# Patient Record
Sex: FEMALE | Race: BLACK OR AFRICAN AMERICAN
[De-identification: names, ages, dates, MRNs, and addresses within clinical notes are randomized per-mention and may not be internally consistent; named-entity substitution may affect disease eponyms.]

---

## 2017-12-09 NOTE — EDM.PDOC
ED HPI GENERAL MEDICAL PROBLEM





- General


Chief Complaint: ENT Problem


Stated Complaint: NOSE BLEED/PREGNANT 9 WEEKS


Time Seen by Provider: 17 20:50


Source of Information: Reports: Patient


History Limitations: Reports: No Limitations





- History of Present Illness


INITIAL COMMENTS - FREE TEXT/NARRATIVE: 


HISTORY AND PHYSICAL:





History of present illness:


[Patient comes to the emergency room complaining of a bloody nose this evening. 

Is 9 weeks pregnant and is afraid that she may be miscarrying. Had no problems 

stopping the flow of blood from her right nostril with pressure in time. States 

that she also coughed up some blood after draining down the back of her throat. 

She is  1 para 0 and is very anxious about her first pregnancy.]





Review of systems: 


As per history of present illness and below otherwise all systems reviewed and 

negative.





Past medical history: 


As per history of present illness and as reviewed below otherwise 

noncontributory.





Surgical history: 


As per history of present illness and as reviewed below otherwise 

noncontributory.





Social history: 


No reported history of drug or alcohol abuse.





Family history: 


As per history of present illness and as reviewed below otherwise 

noncontributory.





Physical exam:


HEENT: Atraumatic, normocephalic. Dried blood present in R nare. No active 

bleeding is appreciated. L nare is clear. Small amount of blood in posterior 

oropharynx.


Extremities: Atraumatic. Full range of motion and no swelling or cyanosis. 

Neurovascular unremarkable.


Neuro: Awake, alert, oriented.  Motor and sensory unremarkable throughout. Exam 

nonfocal.





Impression: 


[Bloody nose


First trimester pregnancy]





Plan:


[Discussed with patient that nosebleeds are not a symptom of miscarriage and 

that her vital signs are stable. Recommend that she hold pressure when she 

experiences a nosebleed and reports the ER for evaluation only if the bleeding 

is unable to be stopped. Encourage no picking of her nose and to use a 

humidifier beside her bed. Follow-up with OB as scheduled. She is in agreement 

with today's plan.]





Definitive disposition and diagnosis as appropriate pending reevaluation and 

review of above.











- Related Data


 Allergies











Allergy/AdvReac Type Severity Reaction Status Date / Time


 


No Known Allergies Allergy   Verified 17 20:24











Home Meds: 


 Home Meds





PNV95/Ferrous Fumarate/FA [Prenatal Tablet] 1 tab PO DAILY 17 [History]











Past Medical History


HEENT History: Reports: None


Cardiovascular History: Reports: None


Respiratory History: Reports: None


Gastrointestinal History: Reports: None


Genitourinary History: Reports: None


OB/GYN History: Reports: None


Musculoskeletal History: Reports: Back Pain, Chronic


Other Musculoskeletal History: chronic bilat. shoulder pain, neck and lower 

back pain from macromastia


Neurological History: Reports: None


Psychiatric History: Reports: None


Endocrine/Metabolic History: Reports: None


Hematologic History: Reports: None


Immunologic History: Reports: None


Oncologic (Cancer) History: Reports: None


Dermatologic History: Reports: Other (See Below)


Other Dermatologic History: rash on both arms





- Infectious Disease History


Infectious Disease History: Reports: Chicken Pox





- Past Surgical History


Head Surgeries/Procedures: Reports: None


HEENT Surgical History: Reports: None


Cardiovascular Surgical History: Reports: None


Respiratory Surgical History: Reports: None


GI Surgical History: Reports: None


Female  Surgical History: Reports: None


Musculoskeletal Surgical History: Reports: Other (See Below)


Other Musculoskeletal Surgeries/Procedures:: Breast reduction


Oncologic Surgical History: Reports: None





Social & Family History





- Tobacco Use


Smoking Status *Q: Former Smoker


Used Tobacco, but Quit: Yes


Month Tobacco Last Used: 





- Caffeine Use


Caffeine Use: Reports: Coffee, Energy Drinks, Soda, Tea





- Recreational Drug Use


Recreational Drug Use: No





ED ROS ENT





- Review of Systems


Review Of Systems: ROS reveals no pertinent complaints other than HPI.





ED EXAM, ENT





- Physical Exam


Exam: See Below





Course





- Vital Signs


Last Recorded V/S: 


 Last Vital Signs











Temp  98.5 F   17 20:22


 


Pulse  94   17 20:22


 


Resp  12   17 20:22


 


BP  130/74   17 20:22


 


Pulse Ox  96   17 20:22














Departure





- Departure


Time of Disposition: 21:00


Disposition: Home, Self-Care 01


Condition: Good


Clinical Impression: 


 First trimester pregnancy








- Discharge Information


Instructions:  First Trimester of Pregnancy, Easy-to-Read


Referrals: 


PCP,None [Primary Care Provider] - 


Forms:  ED Department Discharge


Additional Instructions: 


The following information is given to patients seen in the emergency department 

who are being discharged to home. This information is to outline your options 

for follow-up care. We provide all patients seen in our emergency department 

with a follow-up referral.





The need for follow-up, as well as the timing and circumstances, are variable 

depending upon the specifics of your emergency department visit.





If you don't have a primary care physician on staff, we will provide you with a 

referral. We always advise you to contact your personal physician following an 

emergency department visit to inform them of the circumstance of the visit and 

for follow-up with them and/or the need for any referrals to a consulting 

specialist.





The emergency department will also refer you to a specialist when appropriate. 

This referral assures that you have the opportunity for follow-up care with a 

specialist. All of these measure are taken in an effort to provide you with 

optimal care, which includes your follow-up.





Under all circumstances we always encourage you to contact your private 

physician who remains a resource for coordinating your care. When calling for 

follow-up care, please make the office aware that this follow-up is from your 

recent emergency room visit. If for any reason you are refused follow-up, 

please contact the [facility]  emergency 

department at [phone] and asked to speak to the emergency department 

charge nurse.








[facility]


Primary care - Women's Health


[address] [place] [id number]


Phone: [phone]


Fax: [phone]








Follow-up with your OB doctor as scheduled for later this week.


Continue to monitor your symptoms and push fluids.


Put a humidifier at the bedside.


Return to ER as needed as discussed.

## 2018-07-23 ENCOUNTER — HOSPITAL ENCOUNTER (INPATIENT)
Dept: HOSPITAL 56 - MW.OBCHECK | Age: 28
LOS: 1 days | Discharge: HOME | DRG: 560 | End: 2018-07-24
Attending: OBSTETRICS & GYNECOLOGY | Admitting: OBSTETRICS & GYNECOLOGY
Payer: COMMERCIAL

## 2018-07-23 DIAGNOSIS — O48.0: Primary | ICD-10-CM

## 2018-07-23 DIAGNOSIS — Z3A.41: ICD-10-CM

## 2018-07-23 LAB
CHLORIDE SERPL-SCNC: 102 MMOL/L (ref 98–107)
SODIUM SERPL-SCNC: 135 MMOL/L (ref 136–145)

## 2018-07-23 PROCEDURE — 3E0P7VZ INTRODUCTION OF HORMONE INTO FEMALE REPRODUCTIVE, VIA NATURAL OR ARTIFICIAL OPENING: ICD-10-PCS | Performed by: OBSTETRICS & GYNECOLOGY

## 2018-07-23 PROCEDURE — 0HQ9XZZ REPAIR PERINEUM SKIN, EXTERNAL APPROACH: ICD-10-PCS | Performed by: OBSTETRICS & GYNECOLOGY

## 2018-07-23 PROCEDURE — 3E033VJ INTRODUCTION OF OTHER HORMONE INTO PERIPHERAL VEIN, PERCUTANEOUS APPROACH: ICD-10-PCS | Performed by: OBSTETRICS & GYNECOLOGY

## 2018-07-23 RX ADMIN — MISOPROSTOL PRN MCG: 100 TABLET ORAL at 01:20

## 2018-07-23 RX ADMIN — MISOPROSTOL PRN MCG: 100 TABLET ORAL at 05:57

## 2018-07-23 NOTE — PCM.DEL
L & D Note





- General Info


Date of Service: 18


Mother's Due Date: 18





- Delivery Note


Labor: Induced by Oxytocin


Cervical Ripening Method: Misoprostil


Delivery Outcome: Livebirth


Birth Presentation: Right Occiput Anterior (MATIAS)


Nuchal Cord: Present (around neck and hand )


Anesthesia Type: Epidural


Laceration: 1st Degree


Suture type: Other (monocyrl )


Suture size: 3-0


Placenta: Intact


Cord: 3 Vessels


Estimated Blood Loss: 400


Resuscitation Needed: No


APGAR Score 1 min: 7


APGAR Score 5 min: 9


Delivery Comments (Free Text/Narrative):: 





Live male  delivered at 1928 , apgar 7/9 


1st degree perineal laceration repaired with monocryl 3.0 





Hemabate given with Pitocin 30m/500cc X 2 doses 





- General Info


Date of Service: 18





- Patient Data


Weight - Most Recent: 104.78 kg


Lab Results Last 24 Hours: 


 Laboratory Results - last 24 hr











  18 Range/Units





  00:55 00:55 10:25 


 


WBC  12.15 H    (4.0-11.0)  K/uL


 


RBC  4.50    (4.30-5.90)  M/uL


 


Hgb  13.4    (12.0-16.0)  g/dL


 


Hct  39.5    (36.0-46.0)  %


 


MCV  87.8    (80.0-98.0)  fL


 


MCH  29.8    (27.0-32.0)  pg


 


MCHC  33.9    (31.0-37.0)  g/dL


 


RDW Std Deviation  44.6    (28.0-62.0)  fl


 


RDW Coeff of Sindy  14    (11.0-15.0)  %


 


Plt Count  224    (150-400)  K/uL


 


MPV  10.30    (7.40-12.00)  fL


 


Nucleated RBC %  0.5    /100WBC


 


Nucleated RBCs #  0    K/uL


 


Sodium    135 L  (136-145)  mmol/L


 


Potassium    3.8  (3.5-5.1)  mmol/L


 


Chloride    102  ()  mmol/L


 


Carbon Dioxide    22.6  (21.0-32.0)  mmol/L


 


BUN    6 L  (7.0-18.0)  mg/dL


 


Creatinine    0.7  (0.6-1.0)  mg/dL


 


Est Cr Clr Drug Dosing    118.43  mL/min


 


Estimated GFR (MDRD)    > 60.0  ml/min


 


Glucose    87  ()  mg/dL


 


Uric Acid    4.8  (2.6-7.2)  mg/dL


 


Calcium    9.1  (8.5-10.1)  mg/dL


 


Total Bilirubin    0.2  (0.2-1.0)  mg/dL


 


AST    18  (15-37)  IU/L


 


ALT    19  (14-63)  IU/L


 


Alkaline Phosphatase    106  ()  U/L


 


Total Protein    7.4  (6.4-8.2)  g/dL


 


Albumin    3.0 L  (3.4-5.0)  g/dL


 


Globulin    4.4 H  (2.0-3.5)  g/dL


 


Albumin/Globulin Ratio    0.7 L  (1.3-2.8)  


 


Blood Type   O POSITIVE   


 


Antibody Screen   NEGATIVE   











Med Orders - Current: 


 Current Medications





Acetaminophen (Tylenol Extra Strength)  500 mg PO Q4H PRN


   PRN Reason: Pain


Acetaminophen (Tylenol Extra Strength)  1,000 mg PO Q4H PRN


   PRN Reason: Pain


Benzocaine/Menthol (Dermoplast Pain Relief 20%-0.5% Spray)  78 gm TOP 

ASDIRECTED PRN


   PRN Reason: Perineal Comfort Measure


Bisacodyl (Dulcolax)  10 mg RECTAL .ONCE PRN


   PRN Reason: Constipation


Butorphanol Tartrate (Stadol)  1 mg IVPUSH Q1H PRN


   PRN Reason: Pain


   Last Admin: 18 06:17 Dose:  1 mg


Carboprost Tromethamine (Hemabate Ds)  250 mcg IM ASDIRECTED PRN


   PRN Reason: Post Partum Hemorrhage


Docusate Sodium (Colace)  100 mg PO BID PRN


   PRN Reason: Constipation


Emollient Ointment (Lansinoh Hpa)  0 gm TOP ASDIRECTED PRN


   PRN Reason: Sore Nipples


Lactated Ringer's (Ringers, Lactated)  1,000 mls @ 150 mls/hr IV ASDIRECTED HYACINTH


   Last Admin: 18 17:58 Dose:  150 mls/hr


Oxytocin/Sodium Chloride (Oxytocin 30 Unit/500 Ml-Ns)  30 unit in 500 mls @ 999 

mls/hr IV TITRATE HYACINTH


Tranexamic Acid 1,000 mg/ (Sodium Chloride)  110 mls @ 660 mls/hr IV ONETIME PRN


   PRN Reason: Bleeding


Oxytocin/Sodium Chloride (Oxytocin 30 Unit/500 Ml-Ns)  30 unit in 500 mls @ 2 

mls/hr IV TITRATE HYACINTH; Protocol


   Last Titration: 18 18:05 Dose:  8 munits/min, 8 mls/hr


Ibuprofen (Motrin)  400 mg PO Q4H PRN


   PRN Reason: Pain


Ibuprofen (Motrin)  800 mg PO Q6H PRN


   PRN Reason: Pain


Lidocaine HCl (Xylocaine 1%)  50 ml INJECT .ONCE PRN


   PRN Reason: Laceration repair


Methylergonovine Maleate (Methergine)  0.2 mg IM ASDIRECTED PRN


   PRN Reason: Post Partum Hemorrhage


Misoprostol (Cytotec)  200 mcg PO .ONCE PRN


   PRN Reason: Post Partum Hemorrhage


Misoprostol (Cytotec)  25 mcg VAG Q4HR PRN


   PRN Reason: cervical ripening


   Last Admin: 18 05:57 Dose:  25 mcg


Misoprostol (Cytotec)  25 mcg VAG Q4H PRN


   PRN Reason: Cervical Ripening


Nalbuphine HCl (Nubain)  10 mg IVPUSH Q1H PRN


   PRN Reason: Pain (severe 7-10)


Oxycodone HCl (Oxycodone)  5 mg PO Q2H PRN


   PRN Reason: Pain


Sodium Chloride (Saline Flush)  10 ml FLUSH ASDIRECTED PRN


   PRN Reason: Keep Vein Open


Sodium Chloride (Saline Flush)  2.5 ml FLUSH ASDIRECTED PRN


   PRN Reason: Keep Vein Open


Sterile Water (Sterile Water For Irrigation)  1,000 ml IRR ASDIRECTED PRN


   PRN Reason: delivery


Terbutaline Sulfate (Brethine)  0.25 mg SUBCUT ASDIRECTED PRN


   PRN Reason: Tacysystole


Witch Hazel (Tucks)  1 pad TOP ASDIRECTED PRN


   PRN Reason: comfort care





Discontinued Medications





Ampicillin Sodium 2 gm/ Sodium (Chloride)  100 mls @ 200 mls/hr IV ONETIME ONE


   Stop: 18 00:59


   Last Admin: 18 01:24 Dose:  200 mls/hr


Ampicillin Sodium 1 gm/ Sodium (Chloride)  50 mls @ 100 mls/hr IV Q4H [facility]


   Last Admin: 18 17:40 Dose:  100 mls/hr


Fentanyl/Bupivacaine HCl (Fentanyl-Bupiv-Ns 2 Mcg/Ml-0.125%) Confirm 

Administered Dose 100 mls @ as directed EP .STK-MED ONE


   Stop: 18 09:51


Fentanyl/Bupivacaine HCl (Fentanyl-Bupiv-Ns 2 Mcg/Ml-0.125%) Confirm 

Administered Dose 100 mls @ as directed EP .STK-MED ONE


   Stop: 18 19:11


Oxytocin/Sodium Chloride (Oxytocin 30 Unit/500 Ml-Ns) Confirm Administered Dose 

30 unit in 500 mls @ as directed .ROUTE .STK-MED ONE


   Stop: 18 20:05


Ropivacaine (Naropin 0.2%) Confirm Administered Dose 20 ml .ROUTE .STK-MED ONE


   Stop: 18 09:51











- Problem List Review


Problem List Initiated/Reviewed/Updated: Yes





- My Orders


Last 24 Hours: 


My Active Orders





18 00:30


Patient Status [ADT] Routine 


Fetal Heart Tones [RC] CONTINUOUS 


Fetal Non Stress Test [RC] PER UNIT ROUTINE 


May Shower [RC] ASDIRECTED 


Notify Provider [RC] PRN 


Up ad Reta [RC] ASDIRECTED 


Vaginal Exam [RC] PRN 


Vital Signs [RC] PER UNIT ROUTINE 


Butorphanol [Stadol]   1 mg IVPUSH Q1H PRN 


Carboprost Tromethamine [Hemabate DS]   250 mcg IM ASDIRECTED PRN 


Lactated Ringers [Ringers, Lactated] 1,000 ml IV ASDIRECTED 


Lidocaine 1% [Xylocaine 1%]   50 ml INJECT .ONCE PRN 


Methylergonovine [Methergine]   0.2 mg IM ASDIRECTED PRN 


Nalbuphine [Nubain]   10 mg IVPUSH Q1H PRN 


Oxytocin/0.9 % Sodium Chloride [Oxytocin 30 Unit/500 ML-NS] 30 unit in 500 ml 

IV TITRATE 


Sodium Chloride 0.9% [Saline Flush]   10 ml FLUSH ASDIRECTED PRN 


Sodium Chloride 0.9% [Saline Flush]   2.5 ml FLUSH ASDIRECTED PRN 


Tranexamic Acid [Cyklokapron] 1,000 mg   Sodium Chloride 0.9% [Normal Saline] 

100 ml IV ONETIME 


Water For Irrigation,Sterile [Sterile Water for Irrigation]   1,000 ml IRR 

ASDIRECTED PRN 


miSOPROStol [Cytotec]   200 mcg PO .ONCE PRN 


Peripheral IV Insertion Adult [OM.PC] Routine 


Resuscitation Status Routine 





18 00:34


Bedrest Bathroom Privileges [RC] ASDIRECTED 


Communication Order [RC] ASDIRECTED 


Notify Provider [RC] PRN 


Notify Provider [RC] PRN 


Notify Provider [RC] STAT 


Oxygen Therapy [RC] ASDIRECTED 


Vaginal Exam [RC] PRN 


Vital Signs [RC] PER UNIT ROUTINE 


Terbutaline [Brethine]   0.25 mg SUBCUT ASDIRECTED PRN 


miSOPROStol [Cytotec]   25 mcg VAG Q4H PRN 


miSOPROStol [Cytotec]   25 mcg VAG Q4HR PRN 





18 00:45


Oxytocin/0.9 % Sodium Chloride [Oxytocin 30 Unit/500 ML-NS] 30 unit in 500 ml 

IV TITRATE 


Medication Administration Instruction [OM.PC] Q3H 





18 20:08


Patient Status [ADT] Routine 


May Shower [RC] ASDIRECTED 


Up ad Reta [RC] ASDIRECTED 


Vital Signs [RC] PER UNIT ROUTINE 


Acetaminophen [Tylenol Extra Strength]   1,000 mg PO Q4H PRN 


Acetaminophen [Tylenol Extra Strength]   500 mg PO Q4H PRN 


Benzocaine/Menthol [Dermoplast Pain Relief 20%-0.5% Spray]   78 gm TOP 

ASDIRECTED PRN 


Bisacodyl [Dulcolax]   10 mg RECTAL .ONCE PRN 


Docusate Sodium [Colace]   100 mg PO BID PRN 


Ibuprofen [Motrin]   400 mg PO Q4H PRN 


Ibuprofen [Motrin]   800 mg PO Q6H PRN 


Lanolin [Lansinoh HPA]   See Dose Instructions  TOP ASDIRECTED PRN 


Witch Hazel [Tucks]   1 pad TOP ASDIRECTED PRN 


oxyCODONE   5 mg PO Q2H PRN 


Assess Lochia [WOMSER] Per Unit Routine 


Assess Uterine Involution [WOMSER] Per Unit Routine 


Peripheral IV Discontinue [OM.PC] Routine 





18 Breakfast


Clear Liquid Diet [DIET] 





18 05:11


HEMOGLOBIN/HEMATOCRIT,HH [HEME] Timed

## 2018-07-24 VITALS — DIASTOLIC BLOOD PRESSURE: 81 MMHG | SYSTOLIC BLOOD PRESSURE: 127 MMHG

## 2018-07-24 NOTE — OR
SURGEON:

LESA JUSTIN

 

DATE OF PROCEDURE:  2018

 

PREOPERATIVE DIAGNOSES:

1. A 28-year-old  1, para 0 at 41 weeks 3 days for induction of labor

    secondary to postdates.

2. Group B Streptococcus positive.

3. Mild preeclampsia.

 

POSTOPERATIVE DIAGNOSES:

1. A 28-year-old  1, para 0 at 41 weeks 3 days for induction of labor

    secondary to postdates.

2. Group B Streptococcus positive.

3. Mild preeclampsia.

4. First-degree perineal laceration.

 

PROCEDURE PERFORMED:

Normal spontaneous vaginal delivery.

Repair of perineal laceration 



EBL; 400ml



FINDINGS:

Live male  delivered at 1928.  Apgar scores of 7 and 9.  Weight is 3240

g.  First-degree perineal laceration, which was repaired with 3-0 Monocryl.

Slightly increased vaginal bleeding.  Hemabate given in addition

to IV 30u of Pitocin x2 bags also given.  Three-vessel cord noted.

 

BRIEF HISTORY ABOUT PATIENT:

She is a 28-year-old , uncomplicated prenatal care except for 

gestational proteinuria; However, during the labor course, was noted to have

elevated blood pressure 130s - 140s/80- 90s .  Denies any symptoms of headache

RUQ pain or blurring of vision. She received 2 doses of Cytotec and pitocin for 
IOL 

. She had a normal labor course after cervical ripening. The patient had 
category 1

tracing for the majority of the induction;

 

DESCRIPTION OF PROCEDURE:

With the patient being fully dilated, she was encouraged to push, and the

patient had good pushing effort.  She delivered the head in MATIAS position

followed by the shoulder.  It was noted that the cord was around the neck.

After delivery of the anterior and posterior shoulder, the body of the infant

was delivered.  The infant was placed on the maternal abdomen.  The cord was

clamped and cut.  The infant was given to the the nursery nurse.  Then,

the placenta was delivered via controlled cord traction.  Cord blood gases were

obtained.  A first-degree laceration was noted.  It was sutured with 3 
interrupted stitches

.  After the delivery, some vaginal bleeding was noted and bimanaual  massage 
was done.  Hemabate was given

in addition to IV pitocin X 2 bags .  Vaginal bleeding was then normal . All

instrument and pad counts were correct x2.  The patient was left in labor and

delivery in  stable condition.

 

 

JOANNA / CARMEN

DD:  2018 20:22:08

DT:  2018 04:07:18

Job #:  [id number]

MTDD

## 2018-07-24 NOTE — [id number]
Post Anesthesia Note





- EVALUATION WITHIN 48HRS OF ANESTHETIC


Vital Signs in Normal Range: Yes


Patient Participated in Evaluation: Yes


Respiratory Function Stable: Yes


Airway Patent: Yes


Cardiovascular Function Stable: Yes


Hydration Status Stable: Yes


Pain Control Satisfactory: Yes


Nausea and Vomiting Control Satisfactory: Yes


Mental Status Recovered: Yes


Resp Rate: 16





- COMMENTS/OBSERVATIONS


Free Text/Narrative:: 





full sensation in legs bilaterally

## 2018-07-24 NOTE — PCM.PNPP
- General Info


Date of Service: 07/24/18


Subjective Update: 


She denies any complains , normal lochia , not breast feeding yet 


Functional Status: Reports: Pain Controlled, Tolerating Diet, Ambulating, 

Urinating





- Review of Systems


General: Reports: No Symptoms


HEENT: Reports: No Symptoms


Pulmonary: Reports: No Symptoms


Cardiovascular: Reports: No Symptoms


Gastrointestinal: Reports: No Symptoms


Genitourinary: Reports: No Symptoms


Musculoskeletal: Reports: No Symptoms


Skin: Reports: No Symptoms


Neurological: Reports: No Symptoms


Psychiatric: Reports: No Symptoms





- General Info


Date of Service: 07/24/18





- Patient Data


Vital Signs - Most Recent: 


 Last Vital Signs











Temp  36.3 C   07/24/18 04:28


 


Pulse  94   07/24/18 04:28


 


Resp  16   07/24/18 04:28


 


BP  134/84   07/24/18 04:28


 


Pulse Ox  97   07/24/18 04:28











Weight - Most Recent: 104.78 kg


Lab Results - Last 24 Hours: 


 Laboratory Results - last 24 hr











  07/23/18 07/24/18 Range/Units





  10:25 05:35 


 


Hgb   12.7  (12.0-16.0)  g/dL


 


Hct   37.5  (36.0-46.0)  %


 


Sodium  135 L   (136-145)  mmol/L


 


Potassium  3.8   (3.5-5.1)  mmol/L


 


Chloride  102   ()  mmol/L


 


Carbon Dioxide  22.6   (21.0-32.0)  mmol/L


 


BUN  6 L   (7.0-18.0)  mg/dL


 


Creatinine  0.7   (0.6-1.0)  mg/dL


 


Est Cr Clr Drug Dosing  118.43   mL/min


 


Estimated GFR (MDRD)  > 60.0   ml/min


 


Glucose  87   ()  mg/dL


 


Uric Acid  4.8   (2.6-7.2)  mg/dL


 


Calcium  9.1   (8.5-10.1)  mg/dL


 


Total Bilirubin  0.2   (0.2-1.0)  mg/dL


 


AST  18   (15-37)  IU/L


 


ALT  19   (14-63)  IU/L


 


Alkaline Phosphatase  106   ()  U/L


 


Total Protein  7.4   (6.4-8.2)  g/dL


 


Albumin  3.0 L   (3.4-5.0)  g/dL


 


Globulin  4.4 H   (2.0-3.5)  g/dL


 


Albumin/Globulin Ratio  0.7 L   (1.3-2.8)  











Med Orders - Current: 


 Current Medications





Acetaminophen (Tylenol Extra Strength)  500 mg PO Q4H PRN


   PRN Reason: Pain


Acetaminophen (Tylenol Extra Strength)  1,000 mg PO Q4H PRN


   PRN Reason: Pain


Benzocaine/Menthol (Dermoplast Pain Relief 20%-0.5% Spray)  78 gm TOP 

ASDIRECTED PRN


   PRN Reason: Perineal Comfort Measure


Bisacodyl (Dulcolax)  10 mg RECTAL .ONCE PRN


   PRN Reason: Constipation


Butorphanol Tartrate (Stadol)  1 mg IVPUSH Q1H PRN


   PRN Reason: Pain


   Last Admin: 07/23/18 06:17 Dose:  1 mg


Carboprost Tromethamine (Hemabate Ds)  250 mcg IM ASDIRECTED PRN


   PRN Reason: Post Partum Hemorrhage


   Last Admin: 07/23/18 21:03 Dose:  250 mcg


Docusate Sodium (Colace)  100 mg PO BID PRN


   PRN Reason: Constipation


Emollient Ointment (Lansinoh Hpa)  0 gm TOP ASDIRECTED PRN


   PRN Reason: Sore Nipples


Lactated Ringer's (Ringers, Lactated)  1,000 mls @ 150 mls/hr IV ASDIRECTED HYACINTH


   Last Admin: 07/23/18 17:58 Dose:  150 mls/hr


Oxytocin/Sodium Chloride (Oxytocin 30 Unit/500 Ml-Ns)  30 unit in 500 mls @ 999 

mls/hr IV TITRATE HYACINTH


Tranexamic Acid 1,000 mg/ (Sodium Chloride)  110 mls @ 660 mls/hr IV ONETIME PRN


   PRN Reason: Bleeding


Oxytocin/Sodium Chloride (Oxytocin 30 Unit/500 Ml-Ns)  30 unit in 500 mls @ 2 

mls/hr IV TITRATE HYACINTH; Protocol


   Last Titration: 07/23/18 18:05 Dose:  8 munits/min, 8 mls/hr


Ibuprofen (Motrin)  400 mg PO Q4H PRN


   PRN Reason: Pain


Ibuprofen (Motrin)  800 mg PO Q6H PRN


   PRN Reason: Pain


   Last Admin: 07/23/18 22:35 Dose:  800 mg


Lidocaine HCl (Xylocaine 1%)  50 ml INJECT .ONCE PRN


   PRN Reason: Laceration repair


Methylergonovine Maleate (Methergine)  0.2 mg IM ASDIRECTED PRN


   PRN Reason: Post Partum Hemorrhage


Misoprostol (Cytotec)  200 mcg PO .ONCE PRN


   PRN Reason: Post Partum Hemorrhage


Misoprostol (Cytotec)  25 mcg VAG Q4HR PRN


   PRN Reason: cervical ripening


   Last Admin: 07/23/18 05:57 Dose:  25 mcg


Misoprostol (Cytotec)  25 mcg VAG Q4H PRN


   PRN Reason: Cervical Ripening


Nalbuphine HCl (Nubain)  10 mg IVPUSH Q1H PRN


   PRN Reason: Pain (severe 7-10)


Oxycodone HCl (Oxycodone)  5 mg PO Q2H PRN


   PRN Reason: Pain


Sodium Chloride (Saline Flush)  10 ml FLUSH ASDIRECTED PRN


   PRN Reason: Keep Vein Open


Sodium Chloride (Saline Flush)  2.5 ml FLUSH ASDIRECTED PRN


   PRN Reason: Keep Vein Open


Sterile Water (Sterile Water For Irrigation)  1,000 ml IRR ASDIRECTED PRN


   PRN Reason: delivery


   Last Admin: 07/23/18 19:35 Dose:  1,000 ml


Terbutaline Sulfate (Brethine)  0.25 mg SUBCUT ASDIRECTED PRN


   PRN Reason: Tacysystole


Witch Hazel (Tucks)  1 pad TOP ASDIRECTED PRN


   PRN Reason: comfort care





Discontinued Medications





Ampicillin Sodium 2 gm/ Sodium (Chloride)  100 mls @ 200 mls/hr IV ONETIME ONE


   Stop: 07/23/18 00:59


   Last Admin: 07/23/18 01:24 Dose:  200 mls/hr


Ampicillin Sodium 1 gm/ Sodium (Chloride)  50 mls @ 100 mls/hr IV Q4H [facility]


   Last Admin: 07/23/18 17:40 Dose:  100 mls/hr


Fentanyl/Bupivacaine HCl (Fentanyl-Bupiv-Ns 2 Mcg/Ml-0.125%) Confirm 

Administered Dose 100 mls @ as directed EP .STK-MED ONE


   Stop: 07/23/18 09:51


Fentanyl/Bupivacaine HCl (Fentanyl-Bupiv-Ns 2 Mcg/Ml-0.125%) Confirm 

Administered Dose 100 mls @ as directed EP .STK-MED ONE


   Stop: 07/23/18 19:11


Oxytocin/Sodium Chloride (Oxytocin 30 Unit/500 Ml-Ns) Confirm Administered Dose 

30 unit in 500 mls @ as directed .ROUTE .STK-MED ONE


   Stop: 07/23/18 20:05


   Last Admin: 07/23/18 20:05 Dose:  500 mls/hr


Ropivacaine (Naropin 0.2%) Confirm Administered Dose 20 ml .ROUTE .STK-MED ONE


   Stop: 07/23/18 09:51











- Infant Interaction


Support Person: Significant Other





- Postpartum Recovery Exam


Fundal Tone: Firm


Fundal Level: At Umbilicus


Fundal Placement: Midline


Lochia Amount: Small


Lochia Color: Rubra/Red


Bladder Status: Voiding





- Exam


General: Alert, Oriented


HEENT: Pupils Equal


Neck: Supple


Lungs: Clear to Auscultation


Cardiovascular: Regular Rate, Regular Rhythm


GI/Abdominal Exam: Normal Bowel Sounds


Extremities: Normal Inspection


Neurological: No New Focal Deficit


Psy/Mental Status: Alert





- Problem List & Annotations


(1) Vaginal delivery


SNOMED Code(s): 446257654


   Code(s): O80 - ENCOUNTER FOR FULL-TERM UNCOMPLICATED DELIVERY   Status: 

Acute   Current Visit: Yes   





- Problem List Review


Problem List Initiated/Reviewed/Updated: Yes





- My Orders


Last 24 Hours: 


My Active Orders





07/23/18 20:08


Patient Status [ADT] Routine 


May Shower [RC] ASDIRECTED 


Up ad Reta [RC] ASDIRECTED 


Vital Signs [RC] PER UNIT ROUTINE 


Acetaminophen [Tylenol Extra Strength]   1,000 mg PO Q4H PRN 


Acetaminophen [Tylenol Extra Strength]   500 mg PO Q4H PRN 


Benzocaine/Menthol [Dermoplast Pain Relief 20%-0.5% Spray]   78 gm TOP 

ASDIRECTED PRN 


Bisacodyl [Dulcolax]   10 mg RECTAL .ONCE PRN 


Docusate Sodium [Colace]   100 mg PO BID PRN 


Ibuprofen [Motrin]   400 mg PO Q4H PRN 


Ibuprofen [Motrin]   800 mg PO Q6H PRN 


Lanolin [Lansinoh HPA]   See Dose Instructions  TOP ASDIRECTED PRN 


Witch Hazel [Tucks]   1 pad TOP ASDIRECTED PRN 


oxyCODONE   5 mg PO Q2H PRN 


Assess Lochia [WOMSER] Per Unit Routine 


Assess Uterine Involution [WOMSER] Per Unit Routine 


Peripheral IV Discontinue [OM.PC] Routine 





07/24/18 Breakfast


Regular Diet [DIET] 














- Assessment


Assessment:: 





27 yo P1 s/p vaginal delivery  PPD 1, BP now normal , normal lochia 








- Plan


Plan:: 


Baby has some tachypnea , will be evaluated by Peds this AM 


Possible discharge for mum today at 7pm is baby is discharged


Discharge instructions given

## 2021-07-29 ENCOUNTER — OFFICE VISIT (OUTPATIENT)
Dept: OBSTETRICS AND GYNECOLOGY | Facility: CLINIC | Age: 31
End: 2021-07-29
Payer: COMMERCIAL

## 2021-07-29 VITALS — DIASTOLIC BLOOD PRESSURE: 88 MMHG | SYSTOLIC BLOOD PRESSURE: 130 MMHG | WEIGHT: 221.38 LBS

## 2021-07-29 DIAGNOSIS — Z00.00 WELLNESS EXAMINATION: ICD-10-CM

## 2021-07-29 DIAGNOSIS — N90.7 LABIAL CYST: ICD-10-CM

## 2021-07-29 DIAGNOSIS — Z11.3 SCREEN FOR STD (SEXUALLY TRANSMITTED DISEASE): Primary | ICD-10-CM

## 2021-07-29 PROCEDURE — 99385 PREV VISIT NEW AGE 18-39: CPT | Mod: S$GLB,,, | Performed by: OBSTETRICS & GYNECOLOGY

## 2021-07-29 PROCEDURE — 3079F PR MOST RECENT DIASTOLIC BLOOD PRESSURE 80-89 MM HG: ICD-10-PCS | Mod: S$GLB,,, | Performed by: OBSTETRICS & GYNECOLOGY

## 2021-07-29 PROCEDURE — 87661 TRICHOMONAS VAGINALIS AMPLIF: CPT | Mod: 59 | Performed by: OBSTETRICS & GYNECOLOGY

## 2021-07-29 PROCEDURE — 3079F DIAST BP 80-89 MM HG: CPT | Mod: S$GLB,,, | Performed by: OBSTETRICS & GYNECOLOGY

## 2021-07-29 PROCEDURE — 1159F PR MEDICATION LIST DOCUMENTED IN MEDICAL RECORD: ICD-10-PCS | Mod: S$GLB,,, | Performed by: OBSTETRICS & GYNECOLOGY

## 2021-07-29 PROCEDURE — 87491 CHLMYD TRACH DNA AMP PROBE: CPT | Mod: 59 | Performed by: OBSTETRICS & GYNECOLOGY

## 2021-07-29 PROCEDURE — 87481 CANDIDA DNA AMP PROBE: CPT | Mod: 59 | Performed by: OBSTETRICS & GYNECOLOGY

## 2021-07-29 PROCEDURE — 88175 CYTOPATH C/V AUTO FLUID REDO: CPT | Performed by: OBSTETRICS & GYNECOLOGY

## 2021-07-29 PROCEDURE — 3075F SYST BP GE 130 - 139MM HG: CPT | Mod: S$GLB,,, | Performed by: OBSTETRICS & GYNECOLOGY

## 2021-07-29 PROCEDURE — 99385 PR PREVENTIVE VISIT,NEW,18-39: ICD-10-PCS | Mod: S$GLB,,, | Performed by: OBSTETRICS & GYNECOLOGY

## 2021-07-29 PROCEDURE — 1159F MED LIST DOCD IN RCRD: CPT | Mod: S$GLB,,, | Performed by: OBSTETRICS & GYNECOLOGY

## 2021-07-29 PROCEDURE — 3075F PR MOST RECENT SYSTOLIC BLOOD PRESS GE 130-139MM HG: ICD-10-PCS | Mod: S$GLB,,, | Performed by: OBSTETRICS & GYNECOLOGY

## 2021-07-29 PROCEDURE — 1160F PR REVIEW ALL MEDS BY PRESCRIBER/CLIN PHARMACIST DOCUMENTED: ICD-10-PCS | Mod: S$GLB,,, | Performed by: OBSTETRICS & GYNECOLOGY

## 2021-07-29 PROCEDURE — 87591 N.GONORRHOEAE DNA AMP PROB: CPT | Performed by: OBSTETRICS & GYNECOLOGY

## 2021-07-29 PROCEDURE — 1160F RVW MEDS BY RX/DR IN RCRD: CPT | Mod: S$GLB,,, | Performed by: OBSTETRICS & GYNECOLOGY

## 2021-08-03 LAB
BACTERIAL VAGINOSIS DNA: NEGATIVE
C TRACH DNA SPEC QL NAA+PROBE: NOT DETECTED
CANDIDA GLABRATA DNA: NEGATIVE
CANDIDA KRUSEI DNA: NEGATIVE
CANDIDA RRNA VAG QL PROBE: POSITIVE
N GONORRHOEA DNA SPEC QL NAA+PROBE: NOT DETECTED
T VAGINALIS RRNA GENITAL QL PROBE: NEGATIVE

## 2021-08-05 ENCOUNTER — OFFICE VISIT (OUTPATIENT)
Dept: OBSTETRICS AND GYNECOLOGY | Facility: CLINIC | Age: 31
End: 2021-08-05
Payer: COMMERCIAL

## 2021-08-05 VITALS
SYSTOLIC BLOOD PRESSURE: 124 MMHG | DIASTOLIC BLOOD PRESSURE: 75 MMHG | HEIGHT: 67 IN | WEIGHT: 220 LBS | BODY MASS INDEX: 34.53 KG/M2

## 2021-08-05 DIAGNOSIS — N89.8 VAGINAL WALL CYST: Primary | ICD-10-CM

## 2021-08-05 DIAGNOSIS — B37.31 YEAST VAGINITIS: Primary | ICD-10-CM

## 2021-08-05 PROCEDURE — 3078F PR MOST RECENT DIASTOLIC BLOOD PRESSURE < 80 MM HG: ICD-10-PCS | Mod: S$GLB,,, | Performed by: OBSTETRICS & GYNECOLOGY

## 2021-08-05 PROCEDURE — 56420 I&D BARTHOLINS GLAND ABSCESS: CPT | Mod: S$GLB,,, | Performed by: OBSTETRICS & GYNECOLOGY

## 2021-08-05 PROCEDURE — 3008F PR BODY MASS INDEX (BMI) DOCUMENTED: ICD-10-PCS | Mod: S$GLB,,, | Performed by: OBSTETRICS & GYNECOLOGY

## 2021-08-05 PROCEDURE — 3074F SYST BP LT 130 MM HG: CPT | Mod: S$GLB,,, | Performed by: OBSTETRICS & GYNECOLOGY

## 2021-08-05 PROCEDURE — 1159F MED LIST DOCD IN RCRD: CPT | Mod: S$GLB,,, | Performed by: OBSTETRICS & GYNECOLOGY

## 2021-08-05 PROCEDURE — 1126F AMNT PAIN NOTED NONE PRSNT: CPT | Mod: S$GLB,,, | Performed by: OBSTETRICS & GYNECOLOGY

## 2021-08-05 PROCEDURE — 1159F PR MEDICATION LIST DOCUMENTED IN MEDICAL RECORD: ICD-10-PCS | Mod: S$GLB,,, | Performed by: OBSTETRICS & GYNECOLOGY

## 2021-08-05 PROCEDURE — 3078F DIAST BP <80 MM HG: CPT | Mod: S$GLB,,, | Performed by: OBSTETRICS & GYNECOLOGY

## 2021-08-05 PROCEDURE — 3074F PR MOST RECENT SYSTOLIC BLOOD PRESSURE < 130 MM HG: ICD-10-PCS | Mod: S$GLB,,, | Performed by: OBSTETRICS & GYNECOLOGY

## 2021-08-05 PROCEDURE — 3008F BODY MASS INDEX DOCD: CPT | Mod: S$GLB,,, | Performed by: OBSTETRICS & GYNECOLOGY

## 2021-08-05 PROCEDURE — 1126F PR PAIN SEVERITY QUANTIFIED, NO PAIN PRESENT: ICD-10-PCS | Mod: S$GLB,,, | Performed by: OBSTETRICS & GYNECOLOGY

## 2021-08-05 PROCEDURE — 56420 PR I&D BARTHOLIN GLAND ABSCESS: ICD-10-PCS | Mod: S$GLB,,, | Performed by: OBSTETRICS & GYNECOLOGY

## 2021-08-05 RX ORDER — FLUCONAZOLE 150 MG/1
150 TABLET ORAL DAILY
Qty: 1 TABLET | Refills: 1 | Status: SHIPPED | OUTPATIENT
Start: 2021-08-05 | End: 2021-08-06

## 2021-08-07 LAB
FINAL PATHOLOGIC DIAGNOSIS: NORMAL
Lab: NORMAL

## 2022-01-24 ENCOUNTER — OFFICE VISIT (OUTPATIENT)
Dept: OBSTETRICS AND GYNECOLOGY | Facility: CLINIC | Age: 32
End: 2022-01-24
Payer: COMMERCIAL

## 2022-01-24 VITALS
SYSTOLIC BLOOD PRESSURE: 120 MMHG | WEIGHT: 225.81 LBS | DIASTOLIC BLOOD PRESSURE: 82 MMHG | HEIGHT: 67 IN | BODY MASS INDEX: 35.44 KG/M2

## 2022-01-24 DIAGNOSIS — N92.6 IRREGULAR MENSES: ICD-10-CM

## 2022-01-24 DIAGNOSIS — R10.2 PELVIC PAIN: Primary | ICD-10-CM

## 2022-01-24 PROCEDURE — 99213 OFFICE O/P EST LOW 20 MIN: CPT | Mod: S$GLB,,, | Performed by: OBSTETRICS & GYNECOLOGY

## 2022-01-24 PROCEDURE — 3074F PR MOST RECENT SYSTOLIC BLOOD PRESSURE < 130 MM HG: ICD-10-PCS | Mod: S$GLB,,, | Performed by: OBSTETRICS & GYNECOLOGY

## 2022-01-24 PROCEDURE — 3008F PR BODY MASS INDEX (BMI) DOCUMENTED: ICD-10-PCS | Mod: S$GLB,,, | Performed by: OBSTETRICS & GYNECOLOGY

## 2022-01-24 PROCEDURE — 3074F SYST BP LT 130 MM HG: CPT | Mod: S$GLB,,, | Performed by: OBSTETRICS & GYNECOLOGY

## 2022-01-24 PROCEDURE — 3079F PR MOST RECENT DIASTOLIC BLOOD PRESSURE 80-89 MM HG: ICD-10-PCS | Mod: S$GLB,,, | Performed by: OBSTETRICS & GYNECOLOGY

## 2022-01-24 PROCEDURE — 1159F MED LIST DOCD IN RCRD: CPT | Mod: S$GLB,,, | Performed by: OBSTETRICS & GYNECOLOGY

## 2022-01-24 PROCEDURE — 1160F RVW MEDS BY RX/DR IN RCRD: CPT | Mod: S$GLB,,, | Performed by: OBSTETRICS & GYNECOLOGY

## 2022-01-24 PROCEDURE — 1159F PR MEDICATION LIST DOCUMENTED IN MEDICAL RECORD: ICD-10-PCS | Mod: S$GLB,,, | Performed by: OBSTETRICS & GYNECOLOGY

## 2022-01-24 PROCEDURE — 3008F BODY MASS INDEX DOCD: CPT | Mod: S$GLB,,, | Performed by: OBSTETRICS & GYNECOLOGY

## 2022-01-24 PROCEDURE — 3079F DIAST BP 80-89 MM HG: CPT | Mod: S$GLB,,, | Performed by: OBSTETRICS & GYNECOLOGY

## 2022-01-24 PROCEDURE — 1160F PR REVIEW ALL MEDS BY PRESCRIBER/CLIN PHARMACIST DOCUMENTED: ICD-10-PCS | Mod: S$GLB,,, | Performed by: OBSTETRICS & GYNECOLOGY

## 2022-01-24 PROCEDURE — 99213 PR OFFICE/OUTPT VISIT, EST, LEVL III, 20-29 MIN: ICD-10-PCS | Mod: S$GLB,,, | Performed by: OBSTETRICS & GYNECOLOGY

## 2022-01-24 NOTE — PROGRESS NOTES
Subjective:       Patient ID: Bhumi Campbell is a 31 y.o. female.    Chief Complaint: Pelvic Pain  pt had pain last week on the rt side-has not had a period in over a yr-has Nexplanon but it is . It has not been removed but pt is considering pregnancy and is not sure of what cycle is doing,   HPI  Review of Systems   Genitourinary: Positive for menstrual irregularity and pelvic pain.         Objective:      Physical Exam  Vitals reviewed.   Constitutional:       Appearance: Normal appearance. She is obese.   HENT:      Head: Normocephalic.   Pulmonary:      Effort: Pulmonary effort is normal.   Neurological:      Mental Status: She is alert.         Assessment:       Problem List Items Addressed This Visit    None         Plan:       Disc Nexplanon and its effects on fertility-diff responses and resumption of ovulation from pt to pt  Disc poss of ovulation in regards to pain last week, Enc pt to consider timing of Nexplanon removal

## 2022-01-27 DIAGNOSIS — Z30.46 ENCOUNTER FOR REMOVAL AND REINSERTION OF NEXPLANON: Primary | ICD-10-CM

## 2022-05-30 ENCOUNTER — NURSE TRIAGE (OUTPATIENT)
Dept: ADMINISTRATIVE | Facility: CLINIC | Age: 32
End: 2022-05-30
Payer: COMMERCIAL

## 2022-05-30 ENCOUNTER — OFFICE VISIT (OUTPATIENT)
Dept: OBSTETRICS AND GYNECOLOGY | Facility: CLINIC | Age: 32
End: 2022-05-30
Payer: COMMERCIAL

## 2022-05-30 VITALS
SYSTOLIC BLOOD PRESSURE: 144 MMHG | WEIGHT: 230 LBS | BODY MASS INDEX: 36.1 KG/M2 | DIASTOLIC BLOOD PRESSURE: 90 MMHG | HEIGHT: 67 IN

## 2022-05-30 DIAGNOSIS — B96.89 BV (BACTERIAL VAGINOSIS): ICD-10-CM

## 2022-05-30 DIAGNOSIS — N76.0 BV (BACTERIAL VAGINOSIS): ICD-10-CM

## 2022-05-30 DIAGNOSIS — Z01.419 WOMEN'S ANNUAL ROUTINE GYNECOLOGICAL EXAMINATION: Primary | ICD-10-CM

## 2022-05-30 DIAGNOSIS — N89.8 VAGINAL WALL CYST: ICD-10-CM

## 2022-05-30 PROCEDURE — 3080F PR MOST RECENT DIASTOLIC BLOOD PRESSURE >= 90 MM HG: ICD-10-PCS | Mod: S$GLB,,, | Performed by: OBSTETRICS & GYNECOLOGY

## 2022-05-30 PROCEDURE — 3080F DIAST BP >= 90 MM HG: CPT | Mod: S$GLB,,, | Performed by: OBSTETRICS & GYNECOLOGY

## 2022-05-30 PROCEDURE — 3008F BODY MASS INDEX DOCD: CPT | Mod: S$GLB,,, | Performed by: OBSTETRICS & GYNECOLOGY

## 2022-05-30 PROCEDURE — 1160F PR REVIEW ALL MEDS BY PRESCRIBER/CLIN PHARMACIST DOCUMENTED: ICD-10-PCS | Mod: S$GLB,,, | Performed by: OBSTETRICS & GYNECOLOGY

## 2022-05-30 PROCEDURE — 1159F MED LIST DOCD IN RCRD: CPT | Mod: S$GLB,,, | Performed by: OBSTETRICS & GYNECOLOGY

## 2022-05-30 PROCEDURE — 3077F PR MOST RECENT SYSTOLIC BLOOD PRESSURE >= 140 MM HG: ICD-10-PCS | Mod: S$GLB,,, | Performed by: OBSTETRICS & GYNECOLOGY

## 2022-05-30 PROCEDURE — 56420 PR I&D BARTHOLIN GLAND ABSCESS: ICD-10-PCS | Mod: S$GLB,,, | Performed by: OBSTETRICS & GYNECOLOGY

## 2022-05-30 PROCEDURE — 1160F RVW MEDS BY RX/DR IN RCRD: CPT | Mod: S$GLB,,, | Performed by: OBSTETRICS & GYNECOLOGY

## 2022-05-30 PROCEDURE — 56420 I&D BARTHOLINS GLAND ABSCESS: CPT | Mod: S$GLB,,, | Performed by: OBSTETRICS & GYNECOLOGY

## 2022-05-30 PROCEDURE — 1159F PR MEDICATION LIST DOCUMENTED IN MEDICAL RECORD: ICD-10-PCS | Mod: S$GLB,,, | Performed by: OBSTETRICS & GYNECOLOGY

## 2022-05-30 PROCEDURE — 3008F PR BODY MASS INDEX (BMI) DOCUMENTED: ICD-10-PCS | Mod: S$GLB,,, | Performed by: OBSTETRICS & GYNECOLOGY

## 2022-05-30 PROCEDURE — 3077F SYST BP >= 140 MM HG: CPT | Mod: S$GLB,,, | Performed by: OBSTETRICS & GYNECOLOGY

## 2022-05-30 PROCEDURE — 99395 PR PREVENTIVE VISIT,EST,18-39: ICD-10-PCS | Mod: 25,S$GLB,, | Performed by: OBSTETRICS & GYNECOLOGY

## 2022-05-30 PROCEDURE — 99395 PREV VISIT EST AGE 18-39: CPT | Mod: 25,S$GLB,, | Performed by: OBSTETRICS & GYNECOLOGY

## 2022-05-30 RX ORDER — METRONIDAZOLE 7.5 MG/G
1 GEL VAGINAL 2 TIMES DAILY
Qty: 1 APPLICATOR | Refills: 1 | Status: SHIPPED | OUTPATIENT
Start: 2022-05-30

## 2022-05-30 NOTE — TELEPHONE ENCOUNTER
OOC incoming call - Pt c/o wants to know if her Drs office is open today. Spoke with the  and they confirmed that Dr Neely's office will be open today for her appointment. Pt has no other questions or concerns. Pt denies any need to be triaged at this time. Info only protocol followed and pt advised to see her doc today as scheduled. No PCP listed to route encounter to. Encounter routed to SHARMILA Neely.     Reason for Disposition   Information only question and nurse able to answer    Protocols used: INFORMATION ONLY CALL - NO TRIAGE-A-[place]

## 2022-05-30 NOTE — PROGRESS NOTES
Subjective:       Patient ID: Bhumi Campbell is a 31 y.o. female.    Chief Complaint: Well Woman  here for annual, having trouble with recurrent vaginal SW cyst, no pain but is uncomfortable with sex and walking, desires to have NExplanon removed, has trouble with rec BV as well  HPI  Review of Systems   Genitourinary: Positive for dyspareunia, genital sores and vaginal discharge.         Objective:      Physical Exam  Vitals and nursing note reviewed. Exam conducted with a chaperone present.   Constitutional:       Appearance: Normal appearance. She is obese.   HENT:      Head: Normocephalic and atraumatic.   Pulmonary:      Effort: Pulmonary effort is normal.      Breath sounds: Normal breath sounds.   Chest:   Breasts:      Right: Normal.      Left: Normal.       Genitourinary:     Exam position: Lithotomy position.          Comments: Large mucoid cyst incised and drained and Word catheter placed using sterile technique and using >5cc of 1% lidocaine with epi, pt arcelia procedure well  About 50cc of clear mucus expressed   Musculoskeletal:      Cervical back: Normal range of motion and neck supple.   Neurological:      Mental Status: She is alert.         Assessment:       Problem List Items Addressed This Visit    None         Plan:         Women's annual routine gynecological examination    BV (bacterial vaginosis)    Vaginal wall cyst      RTC for NExplanon removal, disc rec BV and its causes and associations-Metrogel prescribed  RTC 1 week for Word catheter removal

## 2022-06-02 ENCOUNTER — TELEPHONE (OUTPATIENT)
Dept: OBSTETRICS AND GYNECOLOGY | Facility: CLINIC | Age: 32
End: 2022-06-02
Payer: COMMERCIAL

## 2022-06-02 NOTE — TELEPHONE ENCOUNTER
----- Message from Jessica Vanessa sent at 6/2/2022  8:56 AM CDT -----  Contact: pt  Type: Needs Medical Advice    Who Called:  PT  Best Call Back Number: [phone]  Additional Information: Requesting a call back regarding PT wanted  To know her cath came out already. If she needs to come in before Monday please call her.   Please Advise ---Thank you

## 2022-06-06 ENCOUNTER — OFFICE VISIT (OUTPATIENT)
Dept: OBSTETRICS AND GYNECOLOGY | Facility: CLINIC | Age: 32
End: 2022-06-06
Payer: COMMERCIAL

## 2022-06-06 VITALS
DIASTOLIC BLOOD PRESSURE: 85 MMHG | WEIGHT: 230.81 LBS | SYSTOLIC BLOOD PRESSURE: 126 MMHG | HEIGHT: 67 IN | BODY MASS INDEX: 36.22 KG/M2

## 2022-06-06 DIAGNOSIS — N89.8 VAGINAL WALL CYST: Primary | ICD-10-CM

## 2022-06-06 DIAGNOSIS — Z30.46 ENCOUNTER FOR REMOVAL AND REINSERTION OF NEXPLANON: ICD-10-CM

## 2022-06-06 PROCEDURE — 11982 REMOVE DRUG IMPLANT DEVICE: CPT | Mod: S$GLB,,, | Performed by: OBSTETRICS & GYNECOLOGY

## 2022-06-06 PROCEDURE — 99214 OFFICE O/P EST MOD 30 MIN: CPT | Mod: 25,S$GLB,, | Performed by: OBSTETRICS & GYNECOLOGY

## 2022-06-06 PROCEDURE — 3008F PR BODY MASS INDEX (BMI) DOCUMENTED: ICD-10-PCS | Mod: S$GLB,,, | Performed by: OBSTETRICS & GYNECOLOGY

## 2022-06-06 PROCEDURE — 1160F PR REVIEW ALL MEDS BY PRESCRIBER/CLIN PHARMACIST DOCUMENTED: ICD-10-PCS | Mod: S$GLB,,, | Performed by: OBSTETRICS & GYNECOLOGY

## 2022-06-06 PROCEDURE — 11982 PR REMOVAL DRUG IMPLANT DEVICE: ICD-10-PCS | Mod: S$GLB,,, | Performed by: OBSTETRICS & GYNECOLOGY

## 2022-06-06 PROCEDURE — 3074F SYST BP LT 130 MM HG: CPT | Mod: S$GLB,,, | Performed by: OBSTETRICS & GYNECOLOGY

## 2022-06-06 PROCEDURE — 3079F DIAST BP 80-89 MM HG: CPT | Mod: S$GLB,,, | Performed by: OBSTETRICS & GYNECOLOGY

## 2022-06-06 PROCEDURE — 3079F PR MOST RECENT DIASTOLIC BLOOD PRESSURE 80-89 MM HG: ICD-10-PCS | Mod: S$GLB,,, | Performed by: OBSTETRICS & GYNECOLOGY

## 2022-06-06 PROCEDURE — 1160F RVW MEDS BY RX/DR IN RCRD: CPT | Mod: S$GLB,,, | Performed by: OBSTETRICS & GYNECOLOGY

## 2022-06-06 PROCEDURE — 1159F PR MEDICATION LIST DOCUMENTED IN MEDICAL RECORD: ICD-10-PCS | Mod: S$GLB,,, | Performed by: OBSTETRICS & GYNECOLOGY

## 2022-06-06 PROCEDURE — 1159F MED LIST DOCD IN RCRD: CPT | Mod: S$GLB,,, | Performed by: OBSTETRICS & GYNECOLOGY

## 2022-06-06 PROCEDURE — 3074F PR MOST RECENT SYSTOLIC BLOOD PRESSURE < 130 MM HG: ICD-10-PCS | Mod: S$GLB,,, | Performed by: OBSTETRICS & GYNECOLOGY

## 2022-06-06 PROCEDURE — 3008F BODY MASS INDEX DOCD: CPT | Mod: S$GLB,,, | Performed by: OBSTETRICS & GYNECOLOGY

## 2022-06-06 PROCEDURE — 99214 PR OFFICE/OUTPT VISIT, EST, LEVL IV, 30-39 MIN: ICD-10-PCS | Mod: 25,S$GLB,, | Performed by: OBSTETRICS & GYNECOLOGY

## 2022-06-06 NOTE — PROGRESS NOTES
Subjective:       Patient ID: Bhumi Campbell is a 32 y.o. female.    Chief Complaint: Wound Check (Pt is here today for a wound check on her vaginal area Pt also states that she is here for her nexplanon removal.)  Pt here 1 week after I&D of Bartholin's cyst drainage and Gonzales catheter placement-Catheter fell out on about day 3-no pain or bleeding or sexual activity since-has concerns about it re-occuring- disc possibility and need to incise and stitch open the duct  Desires Nexplanon removal today-  HPI  Review of Systems      Objective:      Physical Exam  Vitals and nursing note reviewed. Exam conducted with a chaperone present.   Constitutional:       Appearance: Normal appearance.   Genitourinary:         Comments: Slight swelling at previous site-small amount of white d/C expressed-no mucus expressed, NT non inflamed      Nexplanon removed wtihout diff after consents signed. 1% lidocaine with epi used, #15 blade, iodine prep, seoncd incision made about 1 cm proximal to the first because of scar capsule, arcelia well, hemostatic, steristrips and cobain bandage placed  Assessment:       Problem List Items Addressed This Visit    None         Plan:

## 2022-06-21 ENCOUNTER — PROCEDURE VISIT (OUTPATIENT)
Dept: OBSTETRICS AND GYNECOLOGY | Facility: CLINIC | Age: 32
End: 2022-06-21
Payer: COMMERCIAL

## 2022-06-21 VITALS
SYSTOLIC BLOOD PRESSURE: 121 MMHG | BODY MASS INDEX: 36.29 KG/M2 | DIASTOLIC BLOOD PRESSURE: 75 MMHG | WEIGHT: 231.19 LBS | HEIGHT: 67 IN

## 2022-06-21 DIAGNOSIS — Z30.017 NEXPLANON INSERTION: Primary | ICD-10-CM

## 2022-06-21 PROCEDURE — 11981 PR INSERT, DRUG DELIVERY IMPLANT, BIORESORB/BIODEGR/NON-BIODEGR: ICD-10-PCS | Mod: S$GLB,,, | Performed by: NURSE PRACTITIONER

## 2022-06-21 PROCEDURE — 11981 INSERTION DRUG DLVR IMPLANT: CPT | Mod: S$GLB,,, | Performed by: NURSE PRACTITIONER

## 2022-06-21 NOTE — PROCEDURES
Insertion of Nexplanon    Date/Time: 6/21/2022 3:00 PM  Performed by: NONI Byrd  Authorized by: NONI Byrd     Consent obtained:  Verbal and written  Consent given by:  Patient  Patient questions answered: yes    Patient agrees, verbalizes understanding, and wants to proceed: yes    Educational handouts given: yes    Instructions and paperwork completed: yes    Pre-procedure timeout performed: yes    Prepped with: povidone-iodine    Local anesthetic:  Xylocaine with epinephrine  The site was cleaned and prepped in a sterile fashion: yes    Small stab incision was made in arm: yes    Left/right:  Left   68 mg etonogestrel 68 mg  Preloaded Implanon trocar was placed subdermally: yes    Visualization of implant was obtained: yes    Nexplanon was inserted and trocar removed: yes    Visualization of notch in stilette and palpitation of device: yes    Palpitation confirms placement by provider and patient: yes    Site was closed with steri-strips and pressure bandage applied: yes     Nexplanon inserted without complications; pt tolerated well.  RTC in 1 month for follow up.     Lot# [id number]  Exp: 06/28/2024

## 2023-04-04 ENCOUNTER — PATIENT MESSAGE (OUTPATIENT)
Dept: RESEARCH | Facility: HOSPITAL | Age: 33
End: 2023-04-04
Payer: COMMERCIAL